# Patient Record
Sex: MALE
[De-identification: names, ages, dates, MRNs, and addresses within clinical notes are randomized per-mention and may not be internally consistent; named-entity substitution may affect disease eponyms.]

---

## 2019-06-24 PROBLEM — Z00.00 ENCOUNTER FOR PREVENTIVE HEALTH EXAMINATION: Status: ACTIVE | Noted: 2019-06-24

## 2019-08-01 ENCOUNTER — APPOINTMENT (OUTPATIENT)
Dept: SURGERY | Facility: CLINIC | Age: 29
End: 2019-08-01
Payer: COMMERCIAL

## 2019-08-29 ENCOUNTER — APPOINTMENT (OUTPATIENT)
Dept: SURGERY | Facility: CLINIC | Age: 29
End: 2019-08-29
Payer: COMMERCIAL

## 2019-09-18 PROBLEM — K62.5 RECTAL BLEEDING: Status: ACTIVE | Noted: 2019-09-18

## 2019-09-19 ENCOUNTER — APPOINTMENT (OUTPATIENT)
Dept: SURGERY | Facility: CLINIC | Age: 29
End: 2019-09-19
Payer: COMMERCIAL

## 2019-09-19 VITALS
SYSTOLIC BLOOD PRESSURE: 114 MMHG | DIASTOLIC BLOOD PRESSURE: 71 MMHG | BODY MASS INDEX: 25.11 KG/M2 | HEIGHT: 67 IN | WEIGHT: 160 LBS | OXYGEN SATURATION: 98 % | RESPIRATION RATE: 17 BRPM | HEART RATE: 80 BPM | TEMPERATURE: 98.6 F

## 2019-09-19 DIAGNOSIS — K59.4 ANAL SPASM: ICD-10-CM

## 2019-09-19 DIAGNOSIS — K60.1 CHRONIC ANAL FISSURE: ICD-10-CM

## 2019-09-19 DIAGNOSIS — K62.5 HEMORRHAGE OF ANUS AND RECTUM: ICD-10-CM

## 2019-09-19 DIAGNOSIS — Z78.9 OTHER SPECIFIED HEALTH STATUS: ICD-10-CM

## 2019-09-19 DIAGNOSIS — Z87.19 PERSONAL HISTORY OF OTHER DISEASES OF THE DIGESTIVE SYSTEM: ICD-10-CM

## 2019-09-19 PROCEDURE — 99243 OFF/OP CNSLTJ NEW/EST LOW 30: CPT

## 2019-09-19 RX ORDER — NITROGLYCERIN 4 MG/G
0.4 OINTMENT RECTAL
Qty: 1 | Refills: 4 | Status: ACTIVE | COMMUNITY
Start: 2019-09-19 | End: 1900-01-01

## 2019-09-19 NOTE — ASSESSMENT
[FreeTextEntry1] : I have seen and evaluated patient and I have corroborated all nursing input into this note. 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 The associated skin tags are evidence of the chronicity. The patient has failed treatment with topical therapy. Therefore, I recommended a trial of Botox injections. Indications, risks, benefits, alternatives reviewed including but not limited to bleeding, infection, and failure. If Botox is unsuccessful then surgery will be needed

## 2019-09-19 NOTE — PHYSICAL EXAM
[Normal Breath Sounds] : Normal breath sounds [Normal Heart Sounds] : normal heart sounds [Normal Rate and Rhythm] : normal rate and rhythm [No Edema] : No edema [No Rash or Lesion] : No rash or lesion [Alert] : alert [Oriented to Person] : oriented to person [Oriented to Place] : oriented to place [Oriented to Time] : oriented to time [Anxious] : anxious [Abdomen Masses] : No abdominal masses [Abdomen Tenderness] : ~T No ~M abdominal tenderness [JVD] : no jugular venous distention  [Thyroid] : the thyroid was abnormal [Wheezing] : no wheezing was heard [de-identified] : Appears well nourished [de-identified] : WNL [de-identified] : Full ROM [FreeTextEntry1] : Perianal inspection revealed kissing anal fissures and associate tags. A limited digital exam revealed posterior and anterior anal canal tenderness with sphincter spasm. Anoscopy was deferred because of pain.

## 2019-09-19 NOTE — CONSULT LETTER
[Dear  ___] : Dear ~NINI, [Consult Letter:] : I had the pleasure of evaluating your patient, [unfilled]. [Please see my note below.] : Please see my note below. [Consult Closing:] : Thank you very much for allowing me to participate in the care of this patient.  If you have any questions, please do not hesitate to contact me. [Sincerely,] : Sincerely, [FreeTextEntry2] : Dr. Maura Oliver [FreeTextEntry3] : Jaun Ac M.D., TAYE.FRAN., F.PILI.S.RICHARDRBellaS.\[facility] Chief Colorectal Clinical Services, [facility]

## 2019-09-19 NOTE — HISTORY OF PRESENT ILLNESS
[FreeTextEntry1] : Kristopher is a 30 y/o male here for evaluation of rectal bleeding. PMH of anal fissure. Relief with NTG but symptoms always return.  Reports intermittent rectal bleeding and pain for the past year. Colonoscopy from 1/24/18 demonstrated first degree internal hemorrhoids. External hemorrhoids. \par Pt reports recurrent rectal bleeding and pain with every BM for the past week. \par Pt taking Dulcolax stool softener daily. Having 1-2 normal formed BM daily.